# Patient Record
Sex: FEMALE | Race: BLACK OR AFRICAN AMERICAN | Employment: FULL TIME | URBAN - METROPOLITAN AREA
[De-identification: names, ages, dates, MRNs, and addresses within clinical notes are randomized per-mention and may not be internally consistent; named-entity substitution may affect disease eponyms.]

---

## 2023-06-14 LAB
AMPHETAMINE (PRESENCE) IN URINE BY SCREEN METHOD: ABNORMAL
BARBITURATES PRESENCE IN URINE BY SCREEN METHOD: ABNORMAL
BENZODIAZEPINE (PRESENCE) IN URINE BY SCREEN METHOD: ABNORMAL
CANNABINOIDS IN URINE BY SCREEN METHOD: ABNORMAL
COCAINE (PRESENCE) IN URINE BY SCREEN METHOD: ABNORMAL
DRUG SCREEN COMMENT URINE: ABNORMAL
FENTANYL URINE: ABNORMAL
METHADONE (PRESENCE) IN URINE BY SCREEN METHOD: ABNORMAL
OPIATES (PRESENCE) IN URINE BY SCREEN METHOD: ABNORMAL
OXYCODONE (PRESENCE) IN URINE BY SCREEN METHOD: ABNORMAL
PHENCYCLIDINE (PRESENCE) IN URINE BY SCREEN METHOD: ABNORMAL

## 2023-06-19 LAB
AMPHETAMINES,URINE: >5000 NG/ML
MDA,URINE: <200 NG/ML
MDEA,URINE: <200 NG/ML
MDMA,UR: <200 NG/ML
METHAMPHETAMINE QUANTITATIVE URINE: <200 NG/ML
PHENTERMINE,UR: <200 NG/ML

## 2023-11-07 PROBLEM — J32.9 BACTERIAL SINUSITIS: Status: ACTIVE | Noted: 2023-11-07

## 2023-11-07 PROBLEM — B96.89 BACTERIAL SINUSITIS: Status: ACTIVE | Noted: 2023-11-07

## 2023-11-07 PROBLEM — S92.919A TOE FRACTURE: Status: ACTIVE | Noted: 2023-11-07

## 2023-11-07 PROBLEM — R76.8 RHEUMATOID FACTOR POSITIVE: Status: ACTIVE | Noted: 2023-11-07

## 2023-11-07 PROBLEM — G47.419 NARCOLEPSY (HHS-HCC): Status: ACTIVE | Noted: 2023-11-07

## 2023-11-07 PROBLEM — M06.9 RHEUMATOID ARTHRITIS, ADULT (MULTI): Status: ACTIVE | Noted: 2023-11-07

## 2023-11-07 PROBLEM — S99.929A TOE INJURY: Status: ACTIVE | Noted: 2023-11-07

## 2023-11-07 PROBLEM — G47.419 PRIMARY NARCOLEPSY WITHOUT CATAPLEXY (HHS-HCC): Status: ACTIVE | Noted: 2023-11-07

## 2023-11-07 PROBLEM — M25.859 FEMORAL ACETABULAR IMPINGEMENT: Status: ACTIVE | Noted: 2023-11-07

## 2023-11-07 PROBLEM — L50.8 AUTOIMMUNE URTICARIA: Status: ACTIVE | Noted: 2023-11-07

## 2023-11-07 PROBLEM — M79.10 MYALGIA: Status: ACTIVE | Noted: 2023-11-07

## 2023-11-07 PROBLEM — M87.00 AVASCULAR NECROSIS (MULTI): Status: ACTIVE | Noted: 2023-11-07

## 2023-11-07 RX ORDER — LEVOCETIRIZINE DIHYDROCHLORIDE 5 MG/1
TABLET, FILM COATED ORAL
COMMUNITY

## 2023-11-07 RX ORDER — (CALCIUM, MAGNESIUM, POTASSIUM, AND SODIUM OXYBATES) .5; .5; .5; .5 G/ML; G/ML; G/ML; G/ML
SOLUTION ORAL
COMMUNITY
End: 2023-12-26 | Stop reason: SDUPTHER

## 2023-11-07 RX ORDER — DEXTROAMPHETAMINE SACCHARATE, AMPHETAMINE ASPARTATE, DEXTROAMPHETAMINE SULFATE AND AMPHETAMINE SULFATE 2.5; 2.5; 2.5; 2.5 MG/1; MG/1; MG/1; MG/1
10 TABLET ORAL 3 TIMES DAILY
COMMUNITY
Start: 2020-08-11 | End: 2023-11-08

## 2023-11-07 RX ORDER — ARMODAFINIL 250 MG/1
250 TABLET ORAL DAILY
COMMUNITY
Start: 2021-02-17 | End: 2023-11-24

## 2023-11-07 RX ORDER — ACETAMINOPHEN 500 MG
TABLET ORAL
COMMUNITY

## 2023-11-08 ENCOUNTER — TELEMEDICINE (OUTPATIENT)
Dept: NEUROLOGY | Facility: CLINIC | Age: 43
End: 2023-11-08
Payer: COMMERCIAL

## 2023-11-08 DIAGNOSIS — G47.419 PRIMARY NARCOLEPSY WITHOUT CATAPLEXY (HHS-HCC): Primary | ICD-10-CM

## 2023-11-08 PROCEDURE — 99215 OFFICE O/P EST HI 40 MIN: CPT | Performed by: PSYCHIATRY & NEUROLOGY

## 2023-11-08 RX ORDER — DEXTROAMPHETAMINE SACCHARATE, AMPHETAMINE ASPARTATE, DEXTROAMPHETAMINE SULFATE AND AMPHETAMINE SULFATE 2.5; 2.5; 2.5; 2.5 MG/1; MG/1; MG/1; MG/1
20 TABLET ORAL 3 TIMES DAILY
Qty: 180 TABLET | Refills: 0 | Status: SHIPPED | OUTPATIENT
Start: 2023-11-08 | End: 2024-01-30 | Stop reason: SDUPTHER

## 2023-11-08 RX ORDER — DEXTROAMPHETAMINE SACCHARATE, AMPHETAMINE ASPARTATE, DEXTROAMPHETAMINE SULFATE AND AMPHETAMINE SULFATE 5; 5; 5; 5 MG/1; MG/1; MG/1; MG/1
20 TABLET ORAL 3 TIMES DAILY
COMMUNITY
Start: 2023-11-08 | End: 2023-11-08 | Stop reason: ENTERED-IN-ERROR

## 2023-11-08 NOTE — ASSESSMENT & PLAN NOTE
Narcolepsy without cataplexy. Seen virtually 11/8/2023. Will increase adderall to 20mg TID and try to get xywav co-pay adjusted or find help with co-pay.

## 2023-11-08 NOTE — PROGRESS NOTES
Subjective   Maricarmen Estes is a 43 y.o.   female being seen for narcolepsy without cataplexy.  HPI  Sleepier because she can no longer obtain her xywav. See comments below:  Meds:  Xywav 4.5 grams twice nightly (has not been taking it because co-pay is too high. She has to pay $1500 monthly.  Armodafinil 250mg daily  Adderall 10 mg TID.    She feels more tired without the Xywav.  ESS today is 16 which is sleepier than before.  Last ESS scores were 11-14.  Takes a nap a day if she can fit it in with work. Works from home. Usually sleeps for about 40 minutes.   Finding it harder to get through the day without xywav.    +Sleep attacks  No hypnagogic hallucinations  No sleep paralysis  No cataplexy      Objective   Neurological Exam   Physical Exam    CN II-XII intact. Fundus exam not done.   Mentation and speech are normal.  Gait is normal.    I personally reviewed laboratory, radiographic, and medical studies which were pertinent for this visit. Drug screen reviewed and confirmed.     Assessment/Plan   Problem List Items Addressed This Visit    None  Narcolepsy without cataplexy. Seen virtually 11/8/2023. Will increase adderall to 20mg TID and try to get xywav co-pay adjusted or find help with co-pay.  Maintain other meds.     FU in June with in person exam. Will need another updated controlled substance agreement at that time.   The OARRS website was checked and validated. I have personally reviewed the OARRS report for this patient. This report has been scanned into the electronic medical record. I have considered the risks of abuse, dependence, addiction and diversion. I believe that it is clinically appropriate for this patient to be prescribed this medication. THe patient has been told not to increase dose or refill sooner than indicated. Any variation from this practice will result in my denying further prescriptions.

## 2023-11-23 DIAGNOSIS — G47.419 NARCOLEPSY WITHOUT CATAPLEXY (HHS-HCC): ICD-10-CM

## 2023-11-24 RX ORDER — ARMODAFINIL 250 MG/1
TABLET ORAL
Qty: 30 TABLET | Refills: 5 | Status: SHIPPED | OUTPATIENT
Start: 2023-11-24 | End: 2024-01-30 | Stop reason: SDUPTHER

## 2023-12-26 DIAGNOSIS — G47.411 PRIMARY NARCOLEPSY WITH CATAPLEXY (HHS-HCC): Primary | ICD-10-CM

## 2023-12-26 RX ORDER — (CALCIUM, MAGNESIUM, POTASSIUM, AND SODIUM OXYBATES) .5; .5; .5; .5 G/ML; G/ML; G/ML; G/ML
SOLUTION ORAL
Status: CANCELLED | OUTPATIENT
Start: 2023-12-26

## 2023-12-27 RX ORDER — (CALCIUM, MAGNESIUM, POTASSIUM, AND SODIUM OXYBATES) .5; .5; .5; .5 G/ML; G/ML; G/ML; G/ML
4.5 SOLUTION ORAL 2 TIMES DAILY
Qty: 540 ML | Refills: 0 | Status: SHIPPED | OUTPATIENT
Start: 2023-12-27 | End: 2024-01-26

## 2024-01-30 DIAGNOSIS — G47.419 PRIMARY NARCOLEPSY WITHOUT CATAPLEXY (HHS-HCC): ICD-10-CM

## 2024-01-30 DIAGNOSIS — G47.419 NARCOLEPSY WITHOUT CATAPLEXY (HHS-HCC): ICD-10-CM

## 2024-01-30 RX ORDER — ARMODAFINIL 250 MG/1
TABLET ORAL
Qty: 30 TABLET | Refills: 5 | Status: SHIPPED | OUTPATIENT
Start: 2024-01-30

## 2024-01-30 RX ORDER — DEXTROAMPHETAMINE SACCHARATE, AMPHETAMINE ASPARTATE, DEXTROAMPHETAMINE SULFATE AND AMPHETAMINE SULFATE 2.5; 2.5; 2.5; 2.5 MG/1; MG/1; MG/1; MG/1
20 TABLET ORAL 3 TIMES DAILY
Qty: 180 TABLET | Refills: 0 | Status: SHIPPED | OUTPATIENT
Start: 2024-01-30 | End: 2024-04-12 | Stop reason: SDUPTHER

## 2024-01-31 ENCOUNTER — APPOINTMENT (OUTPATIENT)
Dept: NEUROLOGY | Facility: CLINIC | Age: 44
End: 2024-01-31
Payer: COMMERCIAL

## 2024-04-12 DIAGNOSIS — G47.419 PRIMARY NARCOLEPSY WITHOUT CATAPLEXY (HHS-HCC): ICD-10-CM

## 2024-04-12 RX ORDER — DEXTROAMPHETAMINE SACCHARATE, AMPHETAMINE ASPARTATE, DEXTROAMPHETAMINE SULFATE AND AMPHETAMINE SULFATE 2.5; 2.5; 2.5; 2.5 MG/1; MG/1; MG/1; MG/1
20 TABLET ORAL 3 TIMES DAILY
Qty: 180 TABLET | Refills: 0 | Status: SHIPPED | OUTPATIENT
Start: 2024-04-12 | End: 2024-06-05 | Stop reason: SDUPTHER

## 2024-05-22 ENCOUNTER — OFFICE VISIT (OUTPATIENT)
Dept: PRIMARY CARE | Facility: CLINIC | Age: 44
End: 2024-05-22
Payer: COMMERCIAL

## 2024-05-22 VITALS
WEIGHT: 232 LBS | SYSTOLIC BLOOD PRESSURE: 130 MMHG | HEART RATE: 94 BPM | OXYGEN SATURATION: 97 % | TEMPERATURE: 97 F | BODY MASS INDEX: 36.41 KG/M2 | HEIGHT: 67 IN | DIASTOLIC BLOOD PRESSURE: 90 MMHG

## 2024-05-22 DIAGNOSIS — M05.9 RHEUMATOID ARTHRITIS WITH POSITIVE RHEUMATOID FACTOR, INVOLVING UNSPECIFIED SITE (MULTI): ICD-10-CM

## 2024-05-22 DIAGNOSIS — Z00.00 ROUTINE GENERAL MEDICAL EXAMINATION AT A HEALTH CARE FACILITY: ICD-10-CM

## 2024-05-22 DIAGNOSIS — Z11.3 SCREENING EXAMINATION FOR STD (SEXUALLY TRANSMITTED DISEASE): ICD-10-CM

## 2024-05-22 DIAGNOSIS — Z12.31 ENCOUNTER FOR SCREENING MAMMOGRAM FOR MALIGNANT NEOPLASM OF BREAST: ICD-10-CM

## 2024-05-22 DIAGNOSIS — G47.419 NARCOLEPSY WITHOUT CATAPLEXY (HHS-HCC): ICD-10-CM

## 2024-05-22 DIAGNOSIS — E66.9 OBESITY, CLASS II, BMI 35-39.9: ICD-10-CM

## 2024-05-22 DIAGNOSIS — R61 SWEATING INCREASE: Primary | ICD-10-CM

## 2024-05-22 PROBLEM — E66.812 OBESITY, CLASS II, BMI 35-39.9: Status: ACTIVE | Noted: 2020-01-08

## 2024-05-22 PROBLEM — Z87.39 PERSONAL HISTORY OF OTHER DISEASES OF THE MUSCULOSKELETAL SYSTEM AND CONNECTIVE TISSUE: Status: ACTIVE | Noted: 2024-05-22

## 2024-05-22 PROBLEM — M16.12 PRIMARY OSTEOARTHRITIS OF LEFT HIP: Status: ACTIVE | Noted: 2019-06-19

## 2024-05-22 PROCEDURE — 99214 OFFICE O/P EST MOD 30 MIN: CPT | Performed by: FAMILY MEDICINE

## 2024-05-22 PROCEDURE — 99396 PREV VISIT EST AGE 40-64: CPT | Performed by: FAMILY MEDICINE

## 2024-05-22 PROCEDURE — 1036F TOBACCO NON-USER: CPT | Performed by: FAMILY MEDICINE

## 2024-05-22 RX ORDER — (CALCIUM, MAGNESIUM, POTASSIUM, AND SODIUM OXYBATES) .5; .5; .5; .5 G/ML; G/ML; G/ML; G/ML
SOLUTION ORAL
COMMUNITY
Start: 2024-04-29

## 2024-05-22 NOTE — PROGRESS NOTES
Assessment     ASSESSMENT/PLAN:      Patient Instructions   Hyperhidrosis can managed with oxybutynin but will wait for labs before starting     Get screening labs done and mammogram ordered       Signed by: Dominic Archer DO       FUTURE DIRECTION:   []    Subjective   SUBJECTIVE:     HPI : Patient is a 43 y.o. female who presents today for the following:     Narcolepsy   Follows neurology  On adderall 20 mg TID,  xywav at night, Nuvogil  250 mg daily     Sweating   Has been noticing increased sweating all over body   Ongoing since increasing adderall a couple months ago  States that it does not feel like a heat flashes, but with light activity she breaks out into a sweat  This does not improve with cold showers     History of rheumatoid arthritis   Had positive rheumatoid factor in 2018  Was seen rheumatologist, was not treated   Pt now feels fine, no joint ache or swelling       LMP:  5/1/24, described as irregular , last pap in 2021 was normal  Cervical Ca in 2021,         Review of Systems    Past Medical History:   Diagnosis Date    Allergy status to unspecified drugs, medicaments and biological substances     History of seasonal allergies    Narcolepsy without cataplexy ([facility])     Narcolepsy    Personal history of other diseases of the musculoskeletal system and connective tissue     History of rheumatoid arthritis        Past Surgical History:   Procedure Laterality Date    BREAST SURGERY  11/28/2017    Breast Surgery Reduction Procedure        Current Outpatient Medications   Medication Instructions    acetaminophen (Tylenol Extra Strength) 500 mg tablet oral    amphetamine-dextroamphetamine (Adderall) 10 mg tablet 20 mg, oral, 3 times daily    armodafinil (Nuvigil) 250 mg tablet 1 TABLET IN THE MORNING    famotidine (PEPCID AC ORAL) oral    levocetirizine (Xyzal) 5 mg tablet oral    multivitamin (MULTIPLE VITAMINS ORAL) 1 capsule, oral, Daily    Xywav 0.5 gram/mL solution         Allergies    Allergen Reactions    Aspirin Hives        Social History     Socioeconomic History    Marital status:      Spouse name: Not on file    Number of children: Not on file    Years of education: Not on file    Highest education level: Not on file   Occupational History    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   Tobacco Use    Smoking status: Never    Smokeless tobacco: Never   Substance and Sexual Activity    Alcohol use: Yes     Comment: occassional weekend    Drug use: Not Currently    Sexual activity: Yes   Other Topics Concern    Not on file   Social History Narrative    Not on file     Social Determinants of Health     Financial Resource Strain: Low Risk  (1/6/2020)    Received from [facility]    Overall Financial Resource Strain (CARDIA)     Difficulty of Paying Living Expenses: Not hard at all   Food Insecurity: No Food Insecurity (1/6/2020)    Received from [facility]    Hunger Vital Sign     Worried About Running Out of Food in the Last Year: Never true     Ran Out of Food in the Last Year: Never true   Transportation Needs: No Transportation Needs (1/6/2020)    Received from [facility]    PRAPARE - Transportation     Lack of Transportation (Medical): No     Lack of Transportation (Non-Medical): No   Physical Activity: Not on file   Stress: No Stress Concern Present (1/6/2020)    Received from [facility]    [employer] - Occupational Stress Questionnaire     Feeling of Stress : Not at all   Social Connections: Unknown (1/6/2020)    Received from [facility]    Social Connection and Isolation Panel [NHANES]     Frequency of Communication with Friends and Family: More than three times a week     Frequency of Social Gatherings with Friends and Family: Once a week     Attends Zoroastrianism Services: Patient declined     Active Member of Clubs or Organizations: Yes     Attends Club or Organization Meetings: More than 4 times per year      "Marital Status:    Intimate Partner Violence: Not on file   Housing Stability: Not on file        Family History   Problem Relation Name Age of Onset    Diabetes Mother      Hypertension Mother      Hyperlipidemia Mother      Heart attack Mother      Hyperlipidemia Father      Hypertension Father      Heart attack Father      Narcolepsy Other FHX         Paternal Relatives        Objective     OBJECTIVE:     Vitals:    05/22/24 1441 05/22/24 1537   BP: (!) 136/92 130/90   Pulse: 94    Temp: 36.1 °C (97 °F)    SpO2: 97%    Weight: 105 kg (232 lb)    Height: 1.708 m (5' 7.25\")         Physical Exam  HENT:      Head: Normocephalic and atraumatic.      Right Ear: Tympanic membrane normal.      Left Ear: Tympanic membrane normal.      Nose: Nose normal.      Mouth/Throat:      Mouth: Mucous membranes are moist.   Eyes:      Pupils: Pupils are equal, round, and reactive to light.   Cardiovascular:      Rate and Rhythm: Normal rate and regular rhythm.      Pulses: Normal pulses.      Heart sounds: No murmur heard.  Pulmonary:      Effort: Pulmonary effort is normal.      Breath sounds: Normal breath sounds.   Abdominal:      Tenderness: There is no abdominal tenderness.   Musculoskeletal:         General: Normal range of motion.      Cervical back: Normal range of motion.   Skin:     General: Skin is warm and dry.   Neurological:      Mental Status: She is alert.   Psychiatric:         Mood and Affect: Mood normal.             "

## 2024-05-22 NOTE — PATIENT INSTRUCTIONS
Hyperhidrosis can managed with oxybutynin but will wait for labs before starting     Get screening labs done and mammogram ordered

## 2024-06-05 ENCOUNTER — CONSULT (OUTPATIENT)
Dept: NEUROLOGY | Facility: HOSPITAL | Age: 44
End: 2024-06-05
Payer: COMMERCIAL

## 2024-06-05 VITALS
SYSTOLIC BLOOD PRESSURE: 146 MMHG | BODY MASS INDEX: 36.49 KG/M2 | DIASTOLIC BLOOD PRESSURE: 105 MMHG | HEART RATE: 97 BPM | WEIGHT: 234.7 LBS

## 2024-06-05 DIAGNOSIS — G47.419 PRIMARY NARCOLEPSY WITHOUT CATAPLEXY (HHS-HCC): ICD-10-CM

## 2024-06-05 PROCEDURE — 99214 OFFICE O/P EST MOD 30 MIN: CPT | Performed by: PSYCHIATRY & NEUROLOGY

## 2024-06-05 RX ORDER — DEXTROAMPHETAMINE SACCHARATE, AMPHETAMINE ASPARTATE, DEXTROAMPHETAMINE SULFATE AND AMPHETAMINE SULFATE 2.5; 2.5; 2.5; 2.5 MG/1; MG/1; MG/1; MG/1
20 TABLET ORAL 3 TIMES DAILY
Qty: 180 TABLET | Refills: 0 | Status: SHIPPED | OUTPATIENT
Start: 2024-06-05

## 2024-06-05 ASSESSMENT — PAIN SCALES - GENERAL: PAINLEVEL: 0-NO PAIN

## 2024-06-05 ASSESSMENT — ENCOUNTER SYMPTOMS
OCCASIONAL FEELINGS OF UNSTEADINESS: 0
LOSS OF SENSATION IN FEET: 0
DEPRESSION: 0

## 2024-06-05 NOTE — PROGRESS NOTES
SUBJECTIVE    Maricarmenjess Estes is a 43 y.o. 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       Presents for follow up visit  Visit type: in-clinic visit    HISTORY OF PRESENT ILLNESS    RECAP:  12/2023  Sleepier because she can no longer obtain her xywav. See comments below:  Meds:  Xywav 4.5 grams twice nightly (has not been taking it because co-pay is too high. She has to pay $1500 monthly.  Armodafinil 250mg daily  Adderall 10 mg TID.     She feels more tired without the Xywav.  ESS today is 16 which is sleepier than before.  Last ESS scores were 11-14.  Takes a nap a day if she can fit it in with work. Works from home. Usually sleeps for about 40 minutes.   Finding it harder to get through the day without xywav.     +Sleep attacks  No hypnagogic hallucinations  No sleep paralysis  No cataplexy    06/05/24 -  Because she could no longer get Xywav, we increased adderall to 20mg po TID. She feels it caused excessive sweating, but wished to maintain. Saw PCP who is doing an endocrine work up.   Also on armodafinil 250mg daily.   Xywav 4.5 grams twice nightly. Was able to get coupon. Has noticed a big improvement.    Still working from home. Has not needed to nap. Although ESS is 21 indicating sleepiness, she can still function and do activities of daily living. Major sleepiness is when she is inactive.    -Cataplexy (never has)  No real sleep attacks   No sleep paralysis  Rare hypnagogic hallucinations        EPWORTH SLEEPINESS SCALE   Sitting and reading 4   Watching television 3   Sitting inactive in a public place 3   As passenger in car for an hour or more without a break 4   Lying down to rest in the afternoon when circumstances permit 4   Sitting and talking to someone 0   Sitting quietly after a lunch without alcohol 2   In a car while stopped for a few minutes in traffic 1   TOTAL 21/24         REVIEW OF SYSTEMS:  10 point review of systems performed and is negative except as noted in  the HPI.     Past Medical History:   Diagnosis Date    Allergy status to unspecified drugs, medicaments and biological substances     History of seasonal allergies    Narcolepsy without cataplexy ([facility])     Narcolepsy    Personal history of other diseases of the musculoskeletal system and connective tissue     History of rheumatoid arthritis       No relevant family history has been documented for this patient.    Past Surgical History:   Procedure Laterality Date    BREAST SURGERY  11/28/2017    Breast Surgery Reduction Procedure       Social History     Substance and Sexual Activity   Drug Use Not Currently     Tobacco Use: Low Risk  (5/22/2024)    Patient History     Smoking Tobacco Use: Never     Smokeless Tobacco Use: Never     Passive Exposure: Not on file     Alcohol Use: Not At Risk (1/8/2020)    Received from [facility]    AUDIT-C     Frequency of Alcohol Consumption: 2-4 times a month     Average Number of Drinks: 1 or 2     Frequency of Binge Drinking: Never       Current Outpatient Medications   Medication Instructions    acetaminophen (Tylenol Extra Strength) 500 mg tablet oral    amphetamine-dextroamphetamine (Adderall) 10 mg tablet 20 mg, oral, 3 times daily    armodafinil (Nuvigil) 250 mg tablet 1 TABLET IN THE MORNING    famotidine (PEPCID AC ORAL) oral    levocetirizine (Xyzal) 5 mg tablet oral    multivitamin (MULTIPLE VITAMINS ORAL) 1 capsule, oral, Daily    Xywav 0.5 gram/mL solution          OBJECTIVE    BP (!) 146/105   Pulse 97   Wt 106 kg (234 lb 11.2 oz)   BMI 36.49 kg/m²   Counseled patient regarding high BP. Not symptomatic. Ran from parking lot. Will see PCP.  No results found for this or any previous visit from the past 1825 days.      Lab Results   Component Value Date    WBC 13.4 (H) 01/15/2019    RBC 4.88 01/15/2019    HGB 13.5 01/15/2019    HCT 40.6 01/15/2019     01/15/2019     01/15/2019    K 3.7 01/15/2019     01/15/2019    BUN 14 01/15/2019     CREATININE 0.70 01/15/2019      ______________________________________________________________     OBJECTIVE:  : NONE  Vital Signs: See vitals  GENERAL APPEARANCE:NORMAL     HEENT: PERRLA, funduscopic examination with no hemorrhages or exudates, vessels normal, no papilledema  SKIN is NO RASH  CARDIAC exam is RRR, no murmur, normal S1 and S2.  LUNGS are CTA  ABDOMEN is soft  GENITALIA is deferred  EXTREMITIES no clubbing or cyanosis. No edema.  BRUITS are No carotid bruits.  ARTERIES are deferred.  BACK/SPINE is normal.     NEUROLOGICAL EXAM:     MENTAL STATUS:  Recent and remote memory intact; fund of knowledge normal; attention span and concentration normal     SENSORIUM:Alert and oriented to  Person, place and time.    SPEECH is normal, no dysarthria, aphasia or stuttering, spontaneous speech and comprehension normal        CRANIAL NERVES  I not tested.  II visual fields full to confrontation  II III PERRLA bilaterally  III IV VI EOMI  V strength 5/5 bilaterally for temporal, masseter, and pterygoid muscle,sensation intact in V1, V2, & V3  V VII corneal reflex intact bilaterally  VII face symmetrical  VIII hearing intact  IX X palate symmetrical,normal gag reflex  XII trapezius and SCM symmetrical  XII tongue midline.    GAIT: Normal on straight away. Negative Romberg. Normal tandem gait.  MOTOR:   Bulk .Normal.  Tone: Normal.  Other: None  Muscles: 5/5 strength.    COORDINATION: No tremor or dysmetria.  REFLEXES  1+  .  SENSORY: intact to pinprick.       ASSISTIVE DEVICE: None    OARRS:  Darlin Mane DO on 6/5/2024  1:06 PM  I have personally reviewed the OARRS report for Maricarmen Estes. I have considered the risks of abuse, dependence, addiction and diversion    Is the patient prescribed a combination of a benzodiazepine and opioid?  No    Last Urine Drug Screen / ordered today: Yes  Recent Results (from the past 8760 hour(s))   Amphetamine Confirm, Urine    Collection Time: 06/14/23  12:30 PM   Result Value Ref Range    Amphetamines,Urine >5000 ng/mL    MDA, Urine <200 ng/mL    MDEA, Urine <200 ng/mL    MDMA, Urine <200 ng/mL    Methamphetamine Quant, Ur <200 ng/mL    Phentermine,Urine <200 ng/mL   Drug Screen, Urine With Reflex to Confirmation    Collection Time: 06/14/23 12:30 PM   Result Value Ref Range    DRUG SCREEN COMMENT URINE SEE BELOW     Amphetamine Screen, Urine PRESUMPTIVE POSITIVE (A) NEGATIVE    Barbiturate Screen, Urine PRESUMPTIVE NEGATIVE NEGATIVE    BENZODIAZEPINE (PRESENCE) IN URINE BY SCREEN METHOD PRESUMPTIVE NEGATIVE NEGATIVE    Cannabinoid Screen, Urine PRESUMPTIVE NEGATIVE NEGATIVE    Cocaine Screen, Urine PRESUMPTIVE NEGATIVE NEGATIVE    Fentanyl, Ur PRESUMPTIVE NEGATIVE NEGATIVE    Methadone Screen, Urine PRESUMPTIVE NEGATIVE NEGATIVE    Opiate Screen, Urine PRESUMPTIVE NEGATIVE NEGATIVE    Oxycodone Screen, Ur PRESUMPTIVE NEGATIVE NEGATIVE    PCP Screen, Urine PRESUMPTIVE NEGATIVE NEGATIVE     Results are as expected.           Controlled Substance Agreement:  Date of the Last Agreement: 06/05/2024  Reviewed Controlled Substance Agreement including but not limited to the benefits, risks, and alternatives to treatment with a Controlled Substance medication(s).    Stimulants:   What is the patient's goal of therapy? Able to do activities of daily living  Is this being achieved with current treatment? yes    Activities of Daily Living:   Is your overall impression that this patient is benefiting (symptom reduction outweighs side effects) from stimulant therapy? Yes     1. Physical Functioning: Better  2. Family Relationship: Better  3. Social Relationship: Better  4. Mood: Better  5. Sleep Patterns: Better  6. Overall Function: Better          ASSESSMENT:     Diagnosis and treatment options were explained.     Medication side effects reviewed.      ASSESSMENT & PLAN  Narcolepsy without cataplexy.    REC:  Continue present Rx: Xywav 4.5 grams twice nightly, adderall 20mg  po TID, and armodafinil 250mg daily.  Updated controlled substance agreement signed.    FU in 6 months Can be virtual.

## 2024-06-13 ENCOUNTER — TELEPHONE (OUTPATIENT)
Dept: NEUROLOGY | Facility: HOSPITAL | Age: 44
End: 2024-06-13
Payer: COMMERCIAL

## 2024-06-13 DIAGNOSIS — G47.419 NARCOLEPSY WITHOUT CATAPLEXY (HHS-HCC): Primary | ICD-10-CM

## 2024-06-13 RX ORDER — (CALCIUM, MAGNESIUM, POTASSIUM, AND SODIUM OXYBATES) .5; .5; .5; .5 G/ML; G/ML; G/ML; G/ML
4.5 SOLUTION ORAL
Qty: 810 ML | Refills: 0 | Status: SHIPPED | OUTPATIENT
Start: 2024-06-13

## 2024-06-13 NOTE — TELEPHONE ENCOUNTER
Pharmacy called in ESS-DS [phone]  requesting refill on Xywav 4.5 grams twice nightly . Thank you

## 2024-06-21 ENCOUNTER — APPOINTMENT (OUTPATIENT)
Dept: RADIOLOGY | Facility: HOSPITAL | Age: 44
End: 2024-06-21
Payer: COMMERCIAL

## 2024-06-25 ENCOUNTER — TELEPHONE (OUTPATIENT)
Dept: NEUROLOGY | Facility: HOSPITAL | Age: 44
End: 2024-06-25
Payer: COMMERCIAL

## 2024-06-25 NOTE — TELEPHONE ENCOUNTER
I CALLED PHARMACY TO SEE IF PRESCRIPTION XYWAV 4.5 MG TWICE AT BEDTIME WAS APPROVED . SPOKE TO JAMIA GOOD FROM 6/20/24-6/20/25 . THANK YOU

## 2024-06-28 ENCOUNTER — TELEPHONE (OUTPATIENT)
Dept: PRIMARY CARE | Facility: CLINIC | Age: 44
End: 2024-06-28
Payer: COMMERCIAL

## 2024-06-28 ENCOUNTER — HOSPITAL ENCOUNTER (OUTPATIENT)
Dept: RADIOLOGY | Facility: HOSPITAL | Age: 44
Discharge: HOME | End: 2024-06-28
Payer: COMMERCIAL

## 2024-06-28 VITALS — BODY MASS INDEX: 36.96 KG/M2 | HEIGHT: 66 IN | WEIGHT: 230 LBS

## 2024-06-28 DIAGNOSIS — R92.8 ABNORMAL MAMMOGRAM: Primary | ICD-10-CM

## 2024-06-28 DIAGNOSIS — Z12.31 ENCOUNTER FOR SCREENING MAMMOGRAM FOR MALIGNANT NEOPLASM OF BREAST: ICD-10-CM

## 2024-06-28 PROCEDURE — 77067 SCR MAMMO BI INCL CAD: CPT

## 2024-06-28 NOTE — TELEPHONE ENCOUNTER
----- Message from Dominci Archer DO sent at 6/28/2024  5:07 PM EDT -----  Please call patient and let then know the following:  Mammogram abnormal, repeat imaging ordered

## 2024-07-11 ENCOUNTER — PATIENT MESSAGE (OUTPATIENT)
Dept: NEUROLOGY | Facility: HOSPITAL | Age: 44
End: 2024-07-11
Payer: COMMERCIAL

## 2024-07-11 DIAGNOSIS — G47.419 PRIMARY NARCOLEPSY WITHOUT CATAPLEXY (HHS-HCC): ICD-10-CM

## 2024-07-11 RX ORDER — DEXTROAMPHETAMINE SACCHARATE, AMPHETAMINE ASPARTATE, DEXTROAMPHETAMINE SULFATE AND AMPHETAMINE SULFATE 2.5; 2.5; 2.5; 2.5 MG/1; MG/1; MG/1; MG/1
20 TABLET ORAL 3 TIMES DAILY
Qty: 180 TABLET | Refills: 0 | Status: SHIPPED | OUTPATIENT
Start: 2024-07-11

## 2024-07-23 ENCOUNTER — APPOINTMENT (OUTPATIENT)
Dept: RADIOLOGY | Facility: HOSPITAL | Age: 44
End: 2024-07-23
Payer: COMMERCIAL

## 2024-08-09 ENCOUNTER — LAB (OUTPATIENT)
Dept: LAB | Facility: LAB | Age: 44
End: 2024-08-09
Payer: COMMERCIAL

## 2024-08-09 DIAGNOSIS — Z11.3 SCREENING EXAMINATION FOR STD (SEXUALLY TRANSMITTED DISEASE): ICD-10-CM

## 2024-08-09 DIAGNOSIS — G47.419 PRIMARY NARCOLEPSY WITHOUT CATAPLEXY (HHS-HCC): ICD-10-CM

## 2024-08-09 LAB
AMPHETAMINES UR QL SCN: ABNORMAL
BARBITURATES UR QL SCN: ABNORMAL
BENZODIAZ UR QL SCN: ABNORMAL
BZE UR QL SCN: ABNORMAL
CANNABINOIDS UR QL SCN: ABNORMAL
FENTANYL+NORFENTANYL UR QL SCN: ABNORMAL
METHADONE UR QL SCN: ABNORMAL
OPIATES UR QL SCN: ABNORMAL
OXYCODONE+OXYMORPHONE UR QL SCN: ABNORMAL
PCP UR QL SCN: ABNORMAL

## 2024-08-09 PROCEDURE — 87491 CHLMYD TRACH DNA AMP PROBE: CPT | Performed by: FAMILY MEDICINE

## 2024-08-09 PROCEDURE — 80324 DRUG SCREEN AMPHETAMINES 1/2: CPT

## 2024-08-09 PROCEDURE — 80307 DRUG TEST PRSMV CHEM ANLYZR: CPT

## 2024-08-10 LAB
C TRACH RRNA SPEC QL NAA+PROBE: NEGATIVE
N GONORRHOEA DNA SPEC QL PROBE+SIG AMP: NEGATIVE

## 2024-08-15 LAB
AMPHET UR-MCNC: 2592 NG/ML
MDA UR-MCNC: <200 NG/ML
MDEA UR-MCNC: <200 NG/ML
MDMA UR-MCNC: <200 NG/ML
METHAMPHET UR-MCNC: <200 NG/ML
PHENTERMINE UR CFM-MCNC: <200 NG/ML

## 2024-08-27 DIAGNOSIS — G47.419 NARCOLEPSY WITHOUT CATAPLEXY (HHS-HCC): ICD-10-CM

## 2024-09-04 RX ORDER — ARMODAFINIL 250 MG/1
TABLET ORAL
Qty: 30 TABLET | Refills: 5 | Status: SHIPPED | OUTPATIENT
Start: 2024-09-04

## 2024-11-11 DIAGNOSIS — G47.419 NARCOLEPSY WITHOUT CATAPLEXY (HHS-HCC): ICD-10-CM

## 2024-11-11 RX ORDER — (CALCIUM, MAGNESIUM, POTASSIUM, AND SODIUM OXYBATES) .5; .5; .5; .5 G/ML; G/ML; G/ML; G/ML
4.5 SOLUTION ORAL
Qty: 810 ML | Refills: 0 | Status: SHIPPED | OUTPATIENT
Start: 2024-11-11

## 2024-12-09 ENCOUNTER — TELEPHONE (OUTPATIENT)
Dept: NEUROLOGY | Facility: HOSPITAL | Age: 44
End: 2024-12-09
Payer: COMMERCIAL

## 2024-12-09 DIAGNOSIS — G47.419 PRIMARY NARCOLEPSY WITHOUT CATAPLEXY (HHS-HCC): ICD-10-CM

## 2024-12-09 RX ORDER — DEXTROAMPHETAMINE SACCHARATE, AMPHETAMINE ASPARTATE, DEXTROAMPHETAMINE SULFATE AND AMPHETAMINE SULFATE 2.5; 2.5; 2.5; 2.5 MG/1; MG/1; MG/1; MG/1
20 TABLET ORAL 3 TIMES DAILY
Qty: 180 TABLET | Refills: 0 | Status: SHIPPED | OUTPATIENT
Start: 2024-12-09

## 2024-12-09 NOTE — TELEPHONE ENCOUNTER
Pt requests refill of Adderall 10 mg tab, take 2 tabs tid please to [facility] [address].    Thank you.

## 2024-12-09 NOTE — TELEPHONE ENCOUNTER
Received message from nurse. Patient called in for refill of adderall 20 mg TID.   OARRS:    I have personally reviewed the OARRS report for Maricarmen Estes. I have considered the risks of abuse, dependence, addiction and diversion    Is the patient prescribed a combination of a benzodiazepine and opioid?  Yes, I feel it is clincially indicated to continue the medication and have discussed with the patient risks/benefits/alternatives.    Last Urine Drug Screen / ordered today: No  Recent Results (from the past 8760 hours)   Amphetamine Confirm, Urine    Collection Time: 08/09/24 11:31 AM   Result Value Ref Range    Methamphetamine Quant, Ur <200 ng/mL    MDA, Urine <200 ng/mL    MDEA, Urine <200 ng/mL    Phentermine,Urine <200 ng/mL    Amphetamines,Urine 2592 ng/mL    MDMA, Urine <200 ng/mL   Drug Screen, Urine With Reflex to Confirmation    Collection Time: 08/09/24 11:31 AM   Result Value Ref Range    Amphetamine Screen, Urine Presumptive Positive (A) Presumptive Negative    Barbiturate Screen, Urine Presumptive Negative Presumptive Negative    Benzodiazepines Screen, Urine Presumptive Negative Presumptive Negative    Cannabinoid Screen, Urine Presumptive Negative Presumptive Negative    Cocaine Metabolite Screen, Urine Presumptive Negative Presumptive Negative    Fentanyl Screen, Urine Presumptive Negative Presumptive Negative    Opiate Screen, Urine Presumptive Negative Presumptive Negative    Oxycodone Screen, Urine Presumptive Negative Presumptive Negative    PCP Screen, Urine Presumptive Negative Presumptive Negative    Methadone Screen, Urine Presumptive Negative Presumptive Negative     Results are as expected.         Controlled Substance Agreement:  Date of the Last Agreement: 06/05/2024  Reviewed Controlled Substance Agreement including but not limited to the benefits, risks, and alternatives to treatment with a Controlled Substance medication(s).    Stimulants:   What is the patient's goal of therapy?  Increased wakefulness and alertness.  Is this being achieved with current treatment? Yes    Activities of Daily Living:   Is your overall impression that this patient is benefiting (symptom reduction outweighs side effects) from stimulant therapy? Yes     1. Physical Functioning: Better  2. Family Relationship: Better  3. Social Relationship: Better  4. Mood: Better  5. Sleep Patterns: Better  6. Overall Function: Better

## 2024-12-10 ENCOUNTER — TELEPHONE (OUTPATIENT)
Dept: NEUROLOGY | Facility: HOSPITAL | Age: 44
End: 2024-12-10
Payer: COMMERCIAL

## 2024-12-18 ENCOUNTER — TELEMEDICINE (OUTPATIENT)
Dept: NEUROLOGY | Facility: HOSPITAL | Age: 44
End: 2024-12-18
Payer: COMMERCIAL

## 2024-12-18 DIAGNOSIS — G47.419 PRIMARY NARCOLEPSY WITHOUT CATAPLEXY (HHS-HCC): ICD-10-CM

## 2024-12-18 DIAGNOSIS — G47.419 NARCOLEPSY WITHOUT CATAPLEXY (HHS-HCC): ICD-10-CM

## 2024-12-18 PROCEDURE — 99214 OFFICE O/P EST MOD 30 MIN: CPT | Mod: 95 | Performed by: PSYCHIATRY & NEUROLOGY

## 2024-12-18 PROCEDURE — 99214 OFFICE O/P EST MOD 30 MIN: CPT | Performed by: PSYCHIATRY & NEUROLOGY

## 2024-12-18 RX ORDER — DEXTROAMPHETAMINE SACCHARATE, AMPHETAMINE ASPARTATE, DEXTROAMPHETAMINE SULFATE AND AMPHETAMINE SULFATE 2.5; 2.5; 2.5; 2.5 MG/1; MG/1; MG/1; MG/1
20 TABLET ORAL 3 TIMES DAILY
Qty: 180 TABLET | Refills: 0 | Status: SHIPPED | OUTPATIENT
Start: 2024-12-18

## 2024-12-18 RX ORDER — (CALCIUM, MAGNESIUM, POTASSIUM, AND SODIUM OXYBATES) .5; .5; .5; .5 G/ML; G/ML; G/ML; G/ML
4.5 SOLUTION ORAL
Qty: 810 ML | Refills: 0 | Status: SHIPPED | OUTPATIENT
Start: 2024-12-18

## 2024-12-18 NOTE — PROGRESS NOTES
"SUBJECTIVE    Maricarmen Estes is a 44 y.o. 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   Presents for follow up visit  Visit type: virtual visit Virtual or Telephone Consent    An interactive audio and video telecommunication system which permits real time communications between the patient (at the originating site) and provider (at the distant site) was utilized to provide this telehealth service.   Verbal consent was requested and obtained from Maricarmen Estes on this date, 12/18/24 for a telehealth visit.     HISTORY OF PRESENT ILLNESS    RECAP:  06/05/2024:  06/05/24 -  Because she could no longer get Xywav, we increased adderall to 20mg po TID. She feels it caused excessive sweating, but wished to maintain. Saw PCP who is doing an endocrine work up.   Also on armodafinil 250mg daily.   Xywav 4.5 grams twice nightly. Was able to get coupon. Has noticed a big improvement.     Still working from home. Has not needed to nap. Although ESS is 21 indicating sleepiness, she can still function and do activities of daily living. Major sleepiness is when she is inactive.     -Cataplexy (never has)  No real sleep attacks   No sleep paralysis  Rare hypnagogic hallucinations       12/18/24 -   Current medications:  Xywav 4.5 grams twice nightly.  On adderall 20mg TID    Sleep symptoms are well controlled on current regimen.  \"I no longer feel like I am going to fall asleep at the drop of a pin.\"         EPWORTH SLEEPINESS SCALE   Sitting and reading 1   Watching television 1   Sitting inactive in a public place 3   As passenger in car for an hour or more without a break 3   Lying down to rest in the afternoon when circumstances permit 3   Sitting and talking to someone 0   Sitting quietly after a lunch without alcohol 1   In a car while stopped for a few minutes in traffic 0   TOTAL 12OARRS:  No data recorded  I have personally reviewed the OARRS report for Maricarmen Estes. I have considered the " risks of abuse, dependence, addiction and diversion    Is the patient prescribed a combination of a benzodiazepine and opioid?  Yes, I feel it is clincially indicated to continue the medication and have discussed with the patient risks/benefits/alternatives.    Last Urine Drug Screen / ordered today: No  Recent Results (from the past 8760 hours)   Amphetamine Confirm, Urine    Collection Time: 08/09/24 11:31 AM   Result Value Ref Range    Methamphetamine Quant, Ur <200 ng/mL    MDA, Urine <200 ng/mL    MDEA, Urine <200 ng/mL    Phentermine,Urine <200 ng/mL    Amphetamines,Urine 2592 ng/mL    MDMA, Urine <200 ng/mL   Drug Screen, Urine With Reflex to Confirmation    Collection Time: 08/09/24 11:31 AM   Result Value Ref Range    Amphetamine Screen, Urine Presumptive Positive (A) Presumptive Negative    Barbiturate Screen, Urine Presumptive Negative Presumptive Negative    Benzodiazepines Screen, Urine Presumptive Negative Presumptive Negative    Cannabinoid Screen, Urine Presumptive Negative Presumptive Negative    Cocaine Metabolite Screen, Urine Presumptive Negative Presumptive Negative    Fentanyl Screen, Urine Presumptive Negative Presumptive Negative    Opiate Screen, Urine Presumptive Negative Presumptive Negative    Oxycodone Screen, Urine Presumptive Negative Presumptive Negative    PCP Screen, Urine Presumptive Negative Presumptive Negative    Methadone Screen, Urine Presumptive Negative Presumptive Negative     Results are as expected.         Controlled Substance Agreement:  Date of the Last Agreement: 08/2024  Reviewed Controlled Substance Agreement including but not limited to the benefits, risks, and alternatives to treatment with a Controlled Substance medication(s).    Stimulants:   What is the patient's goal of therapy? Increased alertness  Is this being achieved with current treatment? yes    Activities of Daily Living:   Is your overall impression that this patient is benefiting (symptom reduction  outweighs side effects) from stimulant therapy? Yes     1. Physical Functioning: Better  2. Family Relationship: Better  3. Social Relationship: Better  4. Mood: Better  5. Sleep Patterns: Better  6. Overall Function: Better  /24         REVIEW OF SYSTEMS:  10 point review of systems performed and is negative except as noted in the HPI.     Past Medical History:   Diagnosis Date    Allergy status to unspecified drugs, medicaments and biological substances     History of seasonal allergies    Narcolepsy without cataplexy ([facility])     Narcolepsy    Personal history of other diseases of the musculoskeletal system and connective tissue     History of rheumatoid arthritis       No relevant family history has been documented for this patient.    Past Surgical History:   Procedure Laterality Date    BREAST SURGERY  11/28/2017    Breast Surgery Reduction Procedure       Social History     Substance and Sexual Activity   Drug Use Not Currently     Tobacco Use: Low Risk  (8/19/2024)    Received from [facility]    Patient History     Smoking Tobacco Use: Never     Smokeless Tobacco Use: Never     Passive Exposure: Not on file     Alcohol Use: Not At Risk (1/8/2020)    [facility], [facility]    AUDIT-C     Frequency of Alcohol Consumption: 2-4 times a month     Average Number of Drinks: 1 or 2     Frequency of Binge Drinking: Never       Current Outpatient Medications   Medication Instructions    acetaminophen (Tylenol Extra Strength) 500 mg tablet Take by mouth.    amphetamine-dextroamphetamine (Adderall) 10 mg tablet 20 mg, oral, 3 times daily    armodafinil (Nuvigil) 250 mg tablet TAKE 1 TABLET BY MOUTH EVERY MORNING    famotidine (PEPCID AC ORAL) Take by mouth.    levocetirizine (Xyzal) 5 mg tablet Take by mouth.    multivitamin (MULTIPLE VITAMINS ORAL) 1 capsule, Daily    Xywav 0.5 gram/mL solution 9 mL, oral, 2 times a night         OBJECTIVE    There were no vitals taken for this  visit.    CN II-XII intact. Fundus exam not done.   Mentation and speech are normal.         No results found for this or any previous visit from the past 1825 days.      Lab Results   Component Value Date    WBC 13.4 (H) 01/15/2019    RBC 4.88 01/15/2019    HGB 13.5 01/15/2019    HCT 40.6 01/15/2019     01/15/2019     01/15/2019    K 3.7 01/15/2019     01/15/2019    BUN 14 01/15/2019    CREATININE 0.70 01/15/2019          ASSESSMENT & PLAN  Narcolepsy without cataplexy.  Good control on Xywav 4.5 g twice nightly and adderall 20mg TID      FU in 6 months with Elvie Youssef CNP, as I am retiring.     I personally spent 35 minutes today, exclusive of procedures, providing care for this patient, including preparation, face to face time, documentation and other services such as review of medical records, diagnostic result, patient education, counseling, coordination of care as specified in the encounter.

## 2024-12-30 ENCOUNTER — TELEPHONE (OUTPATIENT)
Dept: NEUROLOGY | Facility: HOSPITAL | Age: 44
End: 2024-12-30
Payer: COMMERCIAL

## 2024-12-30 DIAGNOSIS — G47.419 NARCOLEPSY WITHOUT CATAPLEXY (HHS-HCC): ICD-10-CM

## 2024-12-30 RX ORDER — (CALCIUM, MAGNESIUM, POTASSIUM, AND SODIUM OXYBATES) .5; .5; .5; .5 G/ML; G/ML; G/ML; G/ML
4.5 SOLUTION ORAL
Qty: 540 ML | Refills: 5 | Status: SHIPPED | OUTPATIENT
Start: 2024-12-30 | End: 2025-06-28

## 2025-03-21 DIAGNOSIS — G47.419 NARCOLEPSY WITHOUT CATAPLEXY (HHS-HCC): ICD-10-CM

## 2025-03-21 NOTE — TELEPHONE ENCOUNTER
Rx Refill Request Telephone Encounter    Name:  Maricarmen Estes  :  [id number]  Medication Name:  armodafinil (Nuvigil) 250 mg tablet 1 tab every morning            Specific Pharmacy location:  [facility]    Date of last appointment:  24 with Asher hampton    Date of next appointment:  No fu found    Best number to reach patient:

## 2025-03-24 RX ORDER — ARMODAFINIL 250 MG/1
250 TABLET ORAL DAILY
Qty: 30 TABLET | Refills: 2 | Status: SHIPPED | OUTPATIENT
Start: 2025-03-24 | End: 2025-06-22

## 2025-05-29 ENCOUNTER — TELEPHONE (OUTPATIENT)
Dept: NEUROLOGY | Facility: HOSPITAL | Age: 45
End: 2025-05-29
Payer: COMMERCIAL

## 2025-05-29 DIAGNOSIS — G47.419 NARCOLEPSY WITHOUT CATAPLEXY (HHS-HCC): ICD-10-CM

## 2025-05-29 RX ORDER — (CALCIUM, MAGNESIUM, POTASSIUM, AND SODIUM OXYBATES) .5; .5; .5; .5 G/ML; G/ML; G/ML; G/ML
4.5 SOLUTION ORAL
Qty: 540 ML | Refills: 5 | Status: SHIPPED | OUTPATIENT
Start: 2025-05-29 | End: 2025-11-25

## 2025-05-29 NOTE — TELEPHONE ENCOUNTER
Rx Refill Request Telephone Encounter    Name:  Maricarmen Estes  :  [id number]  Medication Name:  Xywav  4.5 g bid nightly            [facility]  Date of last appointment:  24  Asher hampton  Date of next appointment:  25  Best number to reach patient:    I had to call her and schedule the follow up  she is on you wait list.    I put the fax in your inbox  there is another medication request which I do not see on her med list?

## 2025-05-30 ENCOUNTER — TELEPHONE (OUTPATIENT)
Dept: NEUROLOGY | Facility: HOSPITAL | Age: 45
End: 2025-05-30
Payer: COMMERCIAL

## 2025-05-30 DIAGNOSIS — G47.419 PRIMARY NARCOLEPSY WITHOUT CATAPLEXY (HHS-HCC): Primary | ICD-10-CM

## 2025-05-30 NOTE — TELEPHONE ENCOUNTER
Pt requests refill of Adderall  10 mg tab, take 2 tabs tid please, to [facility].    Thank you.

## 2025-06-02 RX ORDER — DEXTROAMPHETAMINE SACCHARATE, AMPHETAMINE ASPARTATE, DEXTROAMPHETAMINE SULFATE AND AMPHETAMINE SULFATE 2.5; 2.5; 2.5; 2.5 MG/1; MG/1; MG/1; MG/1
20 TABLET ORAL 3 TIMES DAILY
Qty: 180 TABLET | Refills: 0 | Status: SHIPPED | OUTPATIENT
Start: 2025-06-02

## 2025-06-05 PROBLEM — S92.533A: Status: ACTIVE | Noted: 2025-06-05

## 2025-06-05 PROBLEM — M25.559 ARTHRALGIA OF HIP: Status: ACTIVE | Noted: 2025-06-05

## 2025-06-06 ENCOUNTER — APPOINTMENT (OUTPATIENT)
Dept: PRIMARY CARE | Facility: CLINIC | Age: 45
End: 2025-06-06
Payer: COMMERCIAL

## 2025-06-13 ASSESSMENT — ENCOUNTER SYMPTOMS
LOSS OF SENSATION IN FEET: 0
OCCASIONAL FEELINGS OF UNSTEADINESS: 0
DEPRESSION: 0

## 2025-06-16 ENCOUNTER — TELEMEDICINE (OUTPATIENT)
Dept: NEUROLOGY | Facility: HOSPITAL | Age: 45
End: 2025-06-16
Payer: COMMERCIAL

## 2025-06-16 DIAGNOSIS — Z51.81 ENCOUNTER FOR THERAPEUTIC DRUG LEVEL MONITORING: ICD-10-CM

## 2025-06-16 DIAGNOSIS — G47.419 NARCOLEPSY WITHOUT CATAPLEXY (HHS-HCC): ICD-10-CM

## 2025-06-16 DIAGNOSIS — Z79.899 ON LONG TERM DRUG THERAPY: Primary | ICD-10-CM

## 2025-06-16 PROCEDURE — 1036F TOBACCO NON-USER: CPT | Performed by: NURSE PRACTITIONER

## 2025-06-16 PROCEDURE — 99214 OFFICE O/P EST MOD 30 MIN: CPT | Performed by: NURSE PRACTITIONER

## 2025-06-16 RX ORDER — ARMODAFINIL 250 MG/1
250 TABLET ORAL DAILY
Qty: 90 TABLET | Refills: 1 | Status: SHIPPED | OUTPATIENT
Start: 2025-06-16 | End: 2025-12-13

## 2025-06-16 NOTE — ASSESSMENT & PLAN NOTE
Continue current medications  UDS ordered  CSAs sent to patient for signatures  Get me an ADA form to fill out for work if needed to allow nap accomodations

## 2025-06-16 NOTE — PROGRESS NOTES
"[facility]    SUBJECTIVE    Maricarmen Estes is a 44 y.o. right-handed female who presents with   Chief Complaint   Patient presents with    Narcolepsy        Presents for follow up visit  Visit type: virtual visit Virtual or Telephone Consent    An interactive audio and video telecommunication system which permits real time communications between the patient (at the originating site) and provider (at the distant site) was utilized to provide this telehealth service.   Verbal consent was requested and obtained from Maricarmen Estes on this date, 06/16/25 for a telehealth visit and the patient's location was confirmed at the time of the visit.    HISTORY OF PRESENT ILLNESS    RECAP:  Former patient of Dr. Mane, last seen December 2024.    Started seeing Dr. Mane in 2018, reported PSG with no HEATH, and MSLT with [facility] but reports unavailable.   Failed modafinil, sunosi, wakix, and Xyrem  On Armodafinil 250 mg daily, Adderall 20 mg TID, and Xywav 4.5 mg twice nightly    06/05/24 -  Because she could no longer get Xywav, we increased adderall to 20mg po TID. She feels it caused excessive sweating, but wished to maintain. Saw PCP who is doing an endocrine work up.   Also on armodafinil 250mg daily.   Xywav 4.5 grams twice nightly. Was able to get coupon. Has noticed a big improvement.     Still working from home. Has not needed to nap. Although ESS is 21 indicating sleepiness, she can still function and do activities of daily living. Major sleepiness is when she is inactive.     -Cataplexy (never has)  No real sleep attacks   No sleep paralysis  Rare hypnagogic hallucinations    12/18/24 -  Current medications:  Xywav 4.5 grams twice nightly.  On adderall 20mg TID     Sleep symptoms are well controlled on current regimen.  \"I no longer feel like I am going to fall asleep at the drop of a pin.\"     ESS 12/24 06/16/25 - presents on call alone today.     Sleepiness is ok. Has " some during the day still. Not necessarily related to narcolepsy. Working from home currently. 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 Up to 3 days per week.     EPWORTH SLEEPINESS SCALE   Sitting and reading 2   Watching television 2   Sitting inactive in a public place 2   As passenger in car for an hour or more without a break 3   Lying down to rest in the afternoon when circumstances permit 3   Sitting and talking to someone 0   Sitting quietly after a lunch without alcohol 1   In a car while stopped for a few minutes in traffic 1   TOTAL 14/24     No cataplexy, no sleep paralysis, no hypnagogic hallucinations  Sleep attacks are stable currently.     Worried about going back to work and having to drive again.     REVIEW OF SYSTEMS:  10 point review of systems performed and is negative except as noted in the HPI.     Medical History[1]    No relevant family history has been documented for this patient.    Surgical History[2]    Social History     Substance and Sexual Activity   Drug Use Never     Tobacco Use: Low Risk  (6/16/2025)    Patient History     Smoking Tobacco Use: Never     Smokeless Tobacco Use: Never     Passive Exposure: Not on file     Alcohol Use: Not At Risk (1/8/2020)    Received from [facility]    AUDIT-C     Frequency of Alcohol Consumption: 2-4 times a month     Average Number of Drinks: 1 or 2     Frequency of Binge Drinking: Never       Current Outpatient Medications   Medication Instructions    acetaminophen (Tylenol Extra Strength) 500 mg tablet Take by mouth.    amphetamine-dextroamphetamine (Adderall) 10 mg tablet 20 mg, oral, 3 times daily    armodafinil (NUVIGIL) 250 mg, oral, Daily    famotidine (PEPCID AC ORAL) Take by mouth.    levocetirizine (Xyzal) 5 mg tablet Take by mouth.    multivitamin (MULTIPLE VITAMINS ORAL) 1 capsule, Daily    Xywav 0.5 gram/mL solution 9 mL, oral, 2 times a night         OBJECTIVE    There were no vitals taken for this visit.      Physical  Exam  Eyes:      General: Lids are normal.      Extraocular Movements: Extraocular movements intact.   Psychiatric:         Speech: Speech normal.       Neurological Exam  Mental Status  Awake, alert and oriented to person, place and time. Speech is normal. Language is fluent with no aphasia. Attention and concentration are normal. Fund of knowledge is appropriate for level of education.    Cranial Nerves  CN III, IV, VI: Extraocular movements intact bilaterally. Normal lids and orbits bilaterally.  CN VII: Full and symmetric facial movement.  CN VIII: Hearing is normal.    Motor   No abnormal involuntary movements.        No results found for this or any previous visit from the past 1825 days.      Lab Results   Component Value Date    WBC 13.4 (H) 01/15/2019    RBC 4.88 01/15/2019    HGB 13.5 01/15/2019    HCT 40.6 01/15/2019     01/15/2019     01/15/2019    K 3.7 01/15/2019     01/15/2019    BUN 14 01/15/2019    CREATININE 0.70 01/15/2019          ASSESSMENT & PLAN  Problem List Items Addressed This Visit       Narcolepsy without cataplexy ([facility]-HCC)    Overview   Failed modafinil, Sunosi, Wakix, and Xyrem  Prior reported PSG without HEATH followed by MSLT with [facility] - records unavailable    On armodafinil 250 mg daily  On Adderall 20 mg TID  On Xywav 4.5 mg twice nightly         Current Assessment & Plan   Continue current medications  UDS ordered  CSAs sent to patient for signatures  Get me an ADA form to fill out for work if needed to allow nap accomodations         Relevant Medications    armodafinil (Nuvigil) 250 mg tablet     OARRS:  GIACOMO Silva-MEMO on 6/16/2025  8:19 AM  I have personally reviewed the OARRS report for Maricarmen Estes. I have considered the risks of abuse, dependence, addiction and diversion and I believe that it is clinically appropriate for Maricarmen Estes to be prescribed this medication    Is the patient prescribed a combination of a benzodiazepine and  opioid?  No    Last Urine Drug Screen / ordered today: Yes  Recent Results (from the past 8760 hours)   Amphetamine Confirm, Urine    Collection Time: 08/09/24 11:31 AM   Result Value Ref Range    Methamphetamine Quant, Ur <200 ng/mL    MDA, Urine <200 ng/mL    MDEA, Urine <200 ng/mL    Phentermine,Urine <200 ng/mL    Amphetamines,Urine 2592 ng/mL    MDMA, Urine <200 ng/mL   Drug Screen, Urine With Reflex to Confirmation    Collection Time: 08/09/24 11:31 AM   Result Value Ref Range    Amphetamine Screen, Urine Presumptive Positive (A) Presumptive Negative    Barbiturate Screen, Urine Presumptive Negative Presumptive Negative    Benzodiazepines Screen, Urine Presumptive Negative Presumptive Negative    Cannabinoid Screen, Urine Presumptive Negative Presumptive Negative    Cocaine Metabolite Screen, Urine Presumptive Negative Presumptive Negative    Fentanyl Screen, Urine Presumptive Negative Presumptive Negative    Opiate Screen, Urine Presumptive Negative Presumptive Negative    Oxycodone Screen, Urine Presumptive Negative Presumptive Negative    PCP Screen, Urine Presumptive Negative Presumptive Negative    Methadone Screen, Urine Presumptive Negative Presumptive Negative     Results are as expected.         Controlled Substance Agreement:  Date of the Last Agreement: 6/16/25  Reviewed Controlled Substance Agreement including but not limited to the benefits, risks, and alternatives to treatment with a Controlled Substance medication(s).    Stimulants:   What is the patient's goal of therapy? Improved daytime sleepiness  Is this being achieved with current treatment? partially    Activities of Daily Living:   Is your overall impression that this patient is benefiting (symptom reduction outweighs side effects) from stimulant therapy? Yes     1. Physical Functioning: Better  2. Family Relationship: Better  3. Social Relationship: Better  4. Mood: Better  5. Sleep Patterns: Better  6. Overall Function: Better    and  Sleep Aids:   What is the patient's goal of therapy? Narcolepsy treatment  Is this being achieved with current treatment? partially    Activities of Daily Living:   Is your overall impression that this patient is benefiting (symptom reduction outweighs side effects) from sleep aid therapy? Yes     1. Physical Functioning: Better  2. Family Relationship: Better  3. Social Relationship: Better  4. Mood: Better  5. Sleep Patterns: Better  6. Overall Function: Better    FU in 6 months         Elvie Youssef, APRN-MEMO         [1]   Past Medical History:  Diagnosis Date    Allergy status to unspecified drugs, medicaments and biological substances     History of seasonal allergies    Narcolepsy without cataplexy ([facility]-HCC)     Narcolepsy    Personal history of other diseases of the musculoskeletal system and connective tissue     History of rheumatoid arthritis   [2]   Past Surgical History:  Procedure Laterality Date    BREAST SURGERY  11/28/2017    Breast Surgery Reduction Procedure    REDUCTION MAMMAPLASTY  2001

## 2025-08-08 ENCOUNTER — APPOINTMENT (OUTPATIENT)
Dept: PRIMARY CARE | Facility: CLINIC | Age: 45
End: 2025-08-08
Payer: COMMERCIAL

## 2025-08-08 VITALS
WEIGHT: 231.4 LBS | TEMPERATURE: 97 F | SYSTOLIC BLOOD PRESSURE: 146 MMHG | HEART RATE: 97 BPM | DIASTOLIC BLOOD PRESSURE: 90 MMHG | OXYGEN SATURATION: 97 % | HEIGHT: 67 IN | BODY MASS INDEX: 36.32 KG/M2

## 2025-08-08 DIAGNOSIS — Z13.6 ENCOUNTER FOR LIPID SCREENING FOR CARDIOVASCULAR DISEASE: ICD-10-CM

## 2025-08-08 DIAGNOSIS — E55.9 VITAMIN D DEFICIENCY: ICD-10-CM

## 2025-08-08 DIAGNOSIS — Z13.220 ENCOUNTER FOR LIPID SCREENING FOR CARDIOVASCULAR DISEASE: ICD-10-CM

## 2025-08-08 DIAGNOSIS — R73.01 IFG (IMPAIRED FASTING GLUCOSE): ICD-10-CM

## 2025-08-08 DIAGNOSIS — M05.9 RHEUMATOID ARTHRITIS WITH POSITIVE RHEUMATOID FACTOR, INVOLVING UNSPECIFIED SITE (MULTI): ICD-10-CM

## 2025-08-08 DIAGNOSIS — Z12.31 SCREENING MAMMOGRAM FOR BREAST CANCER: Primary | ICD-10-CM

## 2025-08-08 DIAGNOSIS — Z00.00 WELL ADULT EXAM: ICD-10-CM

## 2025-08-08 DIAGNOSIS — R03.0 ELEVATED BP WITHOUT DIAGNOSIS OF HYPERTENSION: ICD-10-CM

## 2025-08-08 DIAGNOSIS — R61 EXCESSIVE SWEATING: ICD-10-CM

## 2025-08-08 DIAGNOSIS — Z11.59 NEED FOR HEPATITIS C SCREENING TEST: ICD-10-CM

## 2025-08-08 DIAGNOSIS — M05.79 RHEUMATOID ARTHRITIS INVOLVING MULTIPLE SITES WITH POSITIVE RHEUMATOID FACTOR (MULTI): ICD-10-CM

## 2025-08-08 DIAGNOSIS — G47.419 NARCOLEPSY WITHOUT CATAPLEXY (HHS-HCC): ICD-10-CM

## 2025-08-08 DIAGNOSIS — Z11.4 ENCOUNTER FOR SCREENING FOR HIV: ICD-10-CM

## 2025-08-08 PROBLEM — R76.8 RHEUMATOID FACTOR POSITIVE: Status: RESOLVED | Noted: 2023-11-07 | Resolved: 2025-08-08

## 2025-08-08 PROBLEM — M06.9 RHEUMATOID ARTHRITIS, ADULT: Status: RESOLVED | Noted: 2023-11-07 | Resolved: 2025-08-08

## 2025-08-08 RX ORDER — NEBULIZER AND COMPRESSOR
1 EACH MISCELLANEOUS DAILY PRN
Qty: 1 EACH | Refills: 0 | Status: SHIPPED | OUTPATIENT
Start: 2025-08-08

## 2025-08-08 ASSESSMENT — ENCOUNTER SYMPTOMS
PALPITATIONS: 0
VOMITING: 0
WOUND: 0
ACTIVITY CHANGE: 0
SORE THROAT: 0
WEAKNESS: 0
NERVOUS/ANXIOUS: 0
COUGH: 0
CONFUSION: 0
RESPIRATORY NEGATIVE: 1
SLEEP DISTURBANCE: 0
FATIGUE: 0
DIZZINESS: 0
SHORTNESS OF BREATH: 0
HEADACHES: 0
ABDOMINAL PAIN: 0
NAUSEA: 0
WHEEZING: 0
APNEA: 0
FEVER: 0
CHILLS: 0
CONSTITUTIONAL NEGATIVE: 1

## 2025-08-08 ASSESSMENT — PATIENT HEALTH QUESTIONNAIRE - PHQ9
1. LITTLE INTEREST OR PLEASURE IN DOING THINGS: NOT AT ALL
SUM OF ALL RESPONSES TO PHQ9 QUESTIONS 1 AND 2: 0
2. FEELING DOWN, DEPRESSED OR HOPELESS: NOT AT ALL

## 2025-08-08 NOTE — ASSESSMENT & PLAN NOTE
BP cuff rx given   Encouraged to check once daily and prn   Call for consistently elevated BP   Stimulant use/      Pr reports +HPT. Pt states lmp 9/26/18 (4w2d). Pt agrees to rotating through all 6 providers (4 female and 2 male). Pt reports taking PNV that includes DHA, iron and folic acid.  Informed pt first appt is with RN and not MD. Pt aware at first appt with RN U

## 2025-08-08 NOTE — ASSESSMENT & PLAN NOTE
Tsh, fsh+lh, insulin and CMP ordered   Does not happen if she holds stimulant   On stimulant TID for narcolepsy   Sleep study >10yrs. Follow up with specialist

## 2025-08-08 NOTE — PROGRESS NOTES
"Subjective   Patient ID: Maricarmen Estes is a 44 y.o. female who presents for Establish Care, Excessive Sweating, and Hypertension.    45 yo female patient here to establish care. Patient with hx of narcolepsy, rheumatoid arthritis- rh factor positive 6 years ago, vitamin d deficiency, obesity, avacular necrosis s/p hip arthroplasty      Menstrual period irregular. Excessive sweating   No issues with sleeping at night   On stimulant and nuvigil for narcolepsy.   Patient thinks it is related to her stimulant -she has trialed her self off and sweating improved. Encouraged her to discuss with neurology who is managing. Consider Adderall ER??      Excessive Sweating  Pertinent negatives include no abdominal pain, chest pain, chills, congestion, coughing, fatigue, fever, headaches, nausea, rash, sore throat, vomiting or weakness.        Review of Systems   Constitutional: Negative.  Negative for activity change, chills, fatigue and fever.   HENT:  Negative for congestion, ear pain, sneezing and sore throat.    Respiratory: Negative.  Negative for apnea, cough, shortness of breath and wheezing.    Cardiovascular:  Negative for chest pain and palpitations.   Gastrointestinal:  Negative for abdominal pain, nausea and vomiting.   Skin:  Negative for rash and wound.   Neurological:  Negative for dizziness, weakness and headaches.   Psychiatric/Behavioral:  Negative for confusion, self-injury, sleep disturbance and suicidal ideas. The patient is not nervous/anxious.        Objective   /90 (BP Location: Left arm)   Pulse 97   Temp 36.1 °C (97 °F)   Ht 1.689 m (5' 6.5\")   Wt 105 kg (231 lb 6.4 oz)   SpO2 97%   BMI 36.79 kg/m²     Physical Exam  Vitals reviewed.   Constitutional:       Appearance: Normal appearance.     Cardiovascular:      Rate and Rhythm: Normal rate.      Pulses: Normal pulses.      Heart sounds: Normal heart sounds.   Pulmonary:      Effort: Pulmonary effort is normal.      Breath sounds: Normal " breath sounds.     Neurological:      Mental Status: She is alert and oriented to person, place, and time.     Psychiatric:         Mood and Affect: Mood normal.         Behavior: Behavior normal.         Assessment/Plan   Problem List Items Addressed This Visit           ICD-10-CM    Narcolepsy without cataplexy ([facility]) G47.419    Continue Xywave, Adderall and nuvigil as directed   Needs new sleep study.          RESOLVED: Rheumatoid arthritis, adult M06.9    Vitamin D deficiency E55.9    Vitamin d level ordered          Relevant Orders    Vitamin D 25-Hydroxy,Total (for eval of Vitamin D levels)    Excessive sweating R61    Tsh, fsh+lh, insulin and CMP ordered   Does not happen if she holds stimulant   On stimulant TID for narcolepsy   Sleep study >10yrs. Follow up with specialist         Relevant Orders    FSH & LH    Estradiol    [facility] INSULIN    Rheumatoid arthritis involving multiple sites with positive rheumatoid factor (Multi) M05.79    6 yrs ago/ rh factor +  Asymptomatic          IFG (impaired fasting glucose) R73.01    Pre diabetes  A1C ordered         Relevant Orders    Hemoglobin A1C    FSH & LH    Estradiol    [facility] INSULIN    Elevated BP without diagnosis of hypertension R03.0    BP cuff rx given   Encouraged to check once daily and prn   Call for consistently elevated BP   Stimulant use/            Relevant Medications    miscellaneous medical supply (Blood Pressure Cuff) misc    Other Relevant Orders    CBC and Auto Differential    Comprehensive Metabolic Panel    TSH with reflex to Free T4 if abnormal    Well adult exam Z00.00    Health Maintenance Topics with due status: Overdue       Topic Date Due    Lipid Panel Never done    Diabetes: Hemoglobin A1C Never done    HIV Screening Never done    MMR Vaccines Never done    Hepatitis C Screening Never done    Hepatitis B Vaccines Never done    HPV Vaccines Never done    Diabetes Screening 01/15/2020    COVID-19 Vaccine Never done    Mammogram  06/28/2025     Health Maintenance Topics with due status: Due Soon       Topic Date Due    Influenza Vaccine 09/01/2025     Health Maintenance Topics with due status: Not Due       Topic Last Completion Date    Cervical Cancer Screening 01/01/2021    Yearly Adult Physical 08/08/2025    DTaP/Tdap/Td Vaccines 08/08/2025    Zoster Vaccines Not Due     Health Maintenance Topics with due status: Aged Out       Topic Date Due    HIB Vaccines Aged Out    IPV Vaccines Aged Out    Hepatitis A Vaccines Aged Out    Meningococcal Vaccine Aged Out    Rotavirus Vaccines Aged Out    Pneumococcal Vaccine: Pediatrics and At-Risk Adult Patients Aged Out             Other Visit Diagnoses         Codes      Screening mammogram for breast cancer    -  Primary Z12.31    Relevant Orders    BI mammo bilateral screening tomosynthesis      Encounter for lipid screening for cardiovascular disease     Z13.220, Z13.6    Relevant Orders    Lipid Panel      Encounter for screening for HIV     Z11.4    Relevant Orders    HIV 1/2 Antigen/Antibody Screen with Reflex to Confirmation      Need for hepatitis C screening test     Z11.59    Relevant Orders    Hepatitis C antibody

## 2025-08-08 NOTE — ASSESSMENT & PLAN NOTE
Health Maintenance Topics with due status: Overdue       Topic Date Due    Lipid Panel Never done    Diabetes: Hemoglobin A1C Never done    HIV Screening Never done    MMR Vaccines Never done    Hepatitis C Screening Never done    Hepatitis B Vaccines Never done    HPV Vaccines Never done    Diabetes Screening 01/15/2020    COVID-19 Vaccine Never done    Mammogram 06/28/2025     Health Maintenance Topics with due status: Due Soon       Topic Date Due    Influenza Vaccine 09/01/2025     Health Maintenance Topics with due status: Not Due       Topic Last Completion Date    Cervical Cancer Screening 01/01/2021    Yearly Adult Physical 08/08/2025    DTaP/Tdap/Td Vaccines 08/08/2025    Zoster Vaccines Not Due     Health Maintenance Topics with due status: Aged Out       Topic Date Due    HIB Vaccines Aged Out    IPV Vaccines Aged Out    Hepatitis A Vaccines Aged Out    Meningococcal Vaccine Aged Out    Rotavirus Vaccines Aged Out    Pneumococcal Vaccine: Pediatrics and At-Risk Adult Patients Aged Out

## 2025-08-12 LAB
AMPHET UR-MCNC: 2443 NG/ML
AMPHETAMINES UR QL: POSITIVE NG/ML
BARBITURATES UR QL: NEGATIVE NG/ML
BENZODIAZ UR QL: NEGATIVE NG/ML
BZE UR QL: NEGATIVE NG/ML
CREAT UR-MCNC: 157.5 MG/DL
DRUG SCREEN COMMENT UR-IMP: ABNORMAL
FENTANYL UR QL SCN: NEGATIVE NG/ML
METHADONE UR QL: NEGATIVE NG/ML
METHAMPHET UR-MCNC: NEGATIVE NG/ML
OPIATES UR QL: NEGATIVE NG/ML
OXIDANTS UR QL: NEGATIVE MCG/ML
OXYCODONE UR QL: NEGATIVE NG/ML
PCP UR QL: NEGATIVE NG/ML
PH UR: 7.8 [PH] (ref 4.5–9)
QUEST NOTES AND COMMENTS: ABNORMAL
THC UR QL: NEGATIVE NG/ML

## 2025-08-21 DIAGNOSIS — G47.419 PRIMARY NARCOLEPSY WITHOUT CATAPLEXY (HHS-HCC): ICD-10-CM

## 2025-08-21 RX ORDER — DEXTROAMPHETAMINE SACCHARATE, AMPHETAMINE ASPARTATE, DEXTROAMPHETAMINE SULFATE AND AMPHETAMINE SULFATE 2.5; 2.5; 2.5; 2.5 MG/1; MG/1; MG/1; MG/1
20 TABLET ORAL 3 TIMES DAILY
Qty: 180 TABLET | Refills: 0 | Status: SHIPPED | OUTPATIENT
Start: 2025-08-21

## 2025-08-22 ENCOUNTER — HOSPITAL ENCOUNTER (OUTPATIENT)
Dept: RADIOLOGY | Facility: CLINIC | Age: 45
Discharge: HOME | End: 2025-08-22
Payer: COMMERCIAL

## 2025-08-22 DIAGNOSIS — Z12.31 SCREENING MAMMOGRAM FOR BREAST CANCER: ICD-10-CM

## 2025-08-22 PROCEDURE — 77063 BREAST TOMOSYNTHESIS BI: CPT

## 2025-08-25 ENCOUNTER — TELEPHONE (OUTPATIENT)
Dept: NEUROLOGY | Facility: HOSPITAL | Age: 45
End: 2025-08-25
Payer: COMMERCIAL

## 2025-09-08 ENCOUNTER — APPOINTMENT (OUTPATIENT)
Dept: PRIMARY CARE | Facility: CLINIC | Age: 45
End: 2025-09-08
Payer: COMMERCIAL

## 2025-09-15 ENCOUNTER — APPOINTMENT (OUTPATIENT)
Dept: PRIMARY CARE | Facility: CLINIC | Age: 45
End: 2025-09-15
Payer: COMMERCIAL

## 2025-09-18 ENCOUNTER — APPOINTMENT (OUTPATIENT)
Dept: NEUROLOGY | Facility: HOSPITAL | Age: 45
End: 2025-09-18
Payer: COMMERCIAL